# Patient Record
Sex: MALE | Race: WHITE | NOT HISPANIC OR LATINO | Employment: FULL TIME | URBAN - METROPOLITAN AREA
[De-identification: names, ages, dates, MRNs, and addresses within clinical notes are randomized per-mention and may not be internally consistent; named-entity substitution may affect disease eponyms.]

---

## 2019-01-21 ENCOUNTER — PATIENT OUTREACH (OUTPATIENT)
Dept: HEALTH INFORMATION MANAGEMENT | Facility: OTHER | Age: 69
End: 2019-01-21

## 2019-01-21 NOTE — PROGRESS NOTES
Outcome: Left Message    Please transfer to Patient Outreach Team at [phone] when patient returns call.    WebIZ Checked & Epic Updated:  yes    HealthConnect Verified: yes    Attempt # 1

## 2019-01-29 NOTE — PROGRESS NOTES
1. Attempt #:2    2. HealthConnect Verified: yes    3. Verify PCP: yes    4. Review Care Team: yes    6. Reviewed/Updated the following with patient:       •   Communication Preference Obtained? YES       •   Preferred Pharmacy? YES       •   Preferred Lab? YES       •   Family History (document living status of immediate family members and if + hx of cancer, diabetes, hypertension, hyperlipidemia, heart attack, stroke) YES    7. Annual Wellness Visit Scheduling  · Scheduling Status:Scheduled     8. Care Gap Scheduling (Attempt to Schedule EACH Overdue Care Gap!)     Health Maintenance Due   Topic Date Due   • DIABETES MONOFILAMENT / LE EXAM  1950   • RETINAL SCREENING  03/28/1970   • URINE ACR / MICROALBUMIN  03/28/1970   • IMM HEP B VACCINE (1 of 3 - Risk 3-dose series) 03/28/1971   • IMM DTaP/Tdap/Td Vaccine (1 - Tdap) 03/28/1971   • IMM ZOSTER VACCINES (1 of 2) 03/28/2002   • A1C SCREENING  10/24/2015   • FASTING LIPID PROFILE  04/24/2016   • SERUM CREATININE  04/24/2016   • Annual Wellness Visit  02/26/2017   • IMM PNEUMOCOCCAL 65+ (ADULT) LOW/MEDIUM RISK SERIES (1 of 2 - PCV13) 03/28/2017   • IMM INFLUENZA (1) 09/01/2018        Scheduled patient for Annual Wellness Visit and New Patient Appt to establish care with Faby     9. [employer] IT Activation: declined    10. Digital [employer] Real: no    11. Virtual Visits: no    12. Opt In to Text Messages: yes    13. Patient was advised: “This is a free wellness visit. The provider will screen for medical conditions to help you stay healthy. If you have other concerns to address you may be asked to discuss these at a separate visit or there may be an additional fee.”     14. Patient was informed to arrive 15 min prior to their scheduled appointment and bring in their medication bottles.

## 2019-02-12 ENCOUNTER — OFFICE VISIT (OUTPATIENT)
Dept: MEDICAL GROUP | Facility: PHYSICIAN GROUP | Age: 69
End: 2019-02-12
Payer: MEDICARE

## 2019-02-12 VITALS
SYSTOLIC BLOOD PRESSURE: 124 MMHG | TEMPERATURE: 98.2 F | BODY MASS INDEX: 24.48 KG/M2 | RESPIRATION RATE: 14 BRPM | HEART RATE: 87 BPM | HEIGHT: 70 IN | OXYGEN SATURATION: 99 % | DIASTOLIC BLOOD PRESSURE: 78 MMHG | WEIGHT: 171 LBS

## 2019-02-12 DIAGNOSIS — G62.9 NEUROPATHY: ICD-10-CM

## 2019-02-12 DIAGNOSIS — Z23 NEED FOR VACCINATION: ICD-10-CM

## 2019-02-12 DIAGNOSIS — E78.2 MIXED HYPERLIPIDEMIA: ICD-10-CM

## 2019-02-12 DIAGNOSIS — K21.00 GASTROESOPHAGEAL REFLUX DISEASE WITH ESOPHAGITIS: ICD-10-CM

## 2019-02-12 DIAGNOSIS — I25.10 CORONARY ARTERY DISEASE INVOLVING NATIVE CORONARY ARTERY OF NATIVE HEART WITHOUT ANGINA PECTORIS: ICD-10-CM

## 2019-02-12 DIAGNOSIS — R73.03 PREDIABETES: ICD-10-CM

## 2019-02-12 DIAGNOSIS — D64.9 ANEMIA, UNSPECIFIED TYPE: ICD-10-CM

## 2019-02-12 PROCEDURE — 90714 TD VACC NO PRESV 7 YRS+ IM: CPT | Performed by: FAMILY MEDICINE

## 2019-02-12 PROCEDURE — G0009 ADMIN PNEUMOCOCCAL VACCINE: HCPCS | Performed by: FAMILY MEDICINE

## 2019-02-12 PROCEDURE — 90670 PCV13 VACCINE IM: CPT | Performed by: FAMILY MEDICINE

## 2019-02-12 PROCEDURE — 99214 OFFICE O/P EST MOD 30 MIN: CPT | Mod: 25 | Performed by: FAMILY MEDICINE

## 2019-02-12 PROCEDURE — 90472 IMMUNIZATION ADMIN EACH ADD: CPT | Performed by: FAMILY MEDICINE

## 2019-02-12 RX ORDER — MORPHINE SULFATE 30 MG/1
TABLET, FILM COATED, EXTENDED RELEASE ORAL
Refills: 0 | COMMUNITY
Start: 2019-02-05

## 2019-02-12 ASSESSMENT — PATIENT HEALTH QUESTIONNAIRE - PHQ9: CLINICAL INTERPRETATION OF PHQ2 SCORE: 0

## 2019-02-12 NOTE — NON-PROVIDER

## 2019-02-12 NOTE — PROGRESS NOTES
CC: I need a primary    HISTORY OF PRESENT ILLNESS: Patient is a 68 y.o. male established patient who presents today to establish with a new provider after not having been seen in some time    Health Maintenance: Completed    Prediabetes  Lab Results   Component Value Date/Time    HBA1C 6.2 (H) 04/24/2015 01:50 PM    HBA1C 6.9 04/06/2011 10:19 AM     He was previously diagnosed with diabetes.  He has lost weight and changed his lifestyle since.  He has not been following this in some time.  He has cut way back on alcohol which he believes was a contributor.    Gastroesophageal reflux disease with esophagitis  He has GERD controlled with omeprazole.  No blood in the stool, dysphagia.    Coronary artery disease involving native coronary artery of native heart without angina pectoris  He had a stent placed in 1995 with Dr. Liao.  He has not been on aspirin in some time.    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  He is managing this with gabapentin.    Anemia  His Hg was 13.6 at his last draw in 2015.  He denies known bleeding and has had a recent colonoscopy      Patient Active Problem List    Diagnosis Date Noted   • Coronary artery disease involving native coronary artery of native heart without angina pectoris 02/12/2019   • Gastroesophageal reflux disease with esophagitis 02/12/2019   • Mixed hyperlipidemia 02/12/2019   • Anemia 02/12/2019   • Snoring 01/26/2012   • Osteoarthritis of CMC joint of thumb 10/27/2011   • Neuropathy (HCC) 04/06/2011   • History of gout 04/06/2011   • Prediabetes 04/06/2011      Allergies:Pcn [penicillins]    Current Outpatient Prescriptions   Medication Sig Dispense Refill   • morphine ER (MS CONTIN) 30 MG Tab CR tablet TK 1 T PO TID  0   • aspirin EC (ECOTRIN) 81 MG Tablet Delayed Response Take 1 Tab by mouth every day. 90 Tab    • gabapentin (NEURONTIN) 300 MG CAPS Take 300 mg by mouth 3 times a day.     • oxycodone IR (ROXICODONE) 10 MG immediate release tablet  Take 1-2 Tabs by mouth every four hours as needed. 150 Tab 0   • omeprazole (PRILOSEC) 20 MG CPDR Take 1 Cap by mouth every day.       No current facility-administered medications for this visit.        Social History   Substance Use Topics   • Smoking status: Former Smoker     Packs/day: 2.00     Years: 10.00     Types: Cigarettes     Quit date: 1/1/1984   • Smokeless tobacco: Never Used   • Alcohol use No      Comment: quit 2008     Social History     Social History Narrative    Retired        Family History   Problem Relation Age of Onset   • Cancer Mother         Colon   • Stroke Father    • Other Father         Coronary Issue   • Heart Disease Father    • Cancer Maternal Aunt         Colon   • Cancer Paternal Aunt         Colon   • Cancer Sister         bladder       Review of Systems:      - Constitutional: Negative for fever, chills, unexpected weight change, and fatigue/generalized weakness.     - HEENT: Negative for headaches, vision changes, hearing changes, ear pain, ear discharge, rhinorrhea, sinus congestion, sore throat, and neck pain.      - Respiratory: Negative for cough, sputum production, chest congestion, dyspnea, wheezing, and crackles.      - Cardiovascular: Negative for chest pain, palpitations, orthopnea, and bilateral lower extremity edema.     - Gastrointestinal: Negative for heartburn, nausea, vomiting, abdominal pain, hematochezia, melena, diarrhea, constipation, and greasy/foul-smelling stools.     - Genitourinary: Negative for dysuria, polyuria, hematuria, pyuria, urinary urgency, and urinary incontinence.    - Musculoskeletal: Negative for myalgias, back pain, and joint pain.     - Skin: Negative for rash, itching, cyanotic skin color change.     - Neurological: Negative for dizziness, tingling, tremors, focal sensory deficit, focal weakness and headaches.     - Endo/Heme/Allergies: Does not bruise/bleed easily.     - Psychiatric/Behavioral: Negative for  "depression, suicidal/homicidal ideation and memory loss.        Exam:    Blood pressure 124/78, pulse 87, temperature 36.8 °C (98.2 °F), resp. rate 14, height 1.778 m (5' 10\"), weight 77.6 kg (171 lb), SpO2 99 %. Body mass index is 24.54 kg/m².    General:  Well nourished, well developed male in NAD  Head is grossly normal.  Neck: Supple without JVD or bruit. Thyroid is not enlarged.  Pulmonary: Clear to ausculation and percussion.  Normal effort. No rales, ronchi, or wheezing.  Cardiovascular: Regular rate and rhythm without murmur. Carotid and radial pulses are intact and equal bilaterally.  Extremities: no clubbing, cyanosis, or edema.        Assessment/Plan:  1. Prediabetes  Discussed lifestyle management and reinforced heathy choices.  Will follow up labs to confirm that this is not worsening.  - Lipid Profile; Future  - Comp Metabolic Panel; Future    2. Coronary artery disease involving native coronary artery of native heart without angina pectoris  Records from [facility] do not appear to be available.  I have asked him to follow up with Dr. Liao as he should be on an antiplatelet after a stent.  There are no contraindications to aspirin use (peptic ulcer, GI bleed or allergy) so I have asked him to resume use for now.    - aspirin EC (ECOTRIN) 81 MG Tablet Delayed Response; Take 1 Tab by mouth every day.  Dispense: 90 Tab  - Lipid Profile; Future    3. Gastroesophageal reflux disease with esophagitis  Symptoms are infrequent, likely improved after he decreased alcohol use.  Will monitor.    4. Mixed hyperlipidemia  With a history of coronary artery disease he should be on a statin.  We will assess levels for risk as he prefers to limit medications    5. Need for vaccination  - Prevnar 13 PCV-13  - TD =>6yo IM    6. Anemia, unspecified type  Will repeat CBC, if still low will need iron studies and Colonoscopy recors  - CBC WITH DIFFERENTIAL; Future    7. Neuropathy (HCC)  Continue gabapentin " for neuropathy.  Dr. Massey manages pain medications.

## 2019-02-12 NOTE — ASSESSMENT & PLAN NOTE
Lab Results   Component Value Date/Time    HBA1C 6.2 (H) 04/24/2015 01:50 PM    HBA1C 6.9 04/06/2011 10:19 AM     He was previously diagnosed with diabetes.  He has lost weight and changed his lifestyle since.  He has not been following this in some time.  He has cut way back on alcohol which he believes was a contributor.

## 2019-02-12 NOTE — PATIENT INSTRUCTIONS
Today, your Healthcare Provider may have discussed the following recommendations:    1. Exercise and Physical Activity  According to the American Heart Association, it is recommended to engage in physical activity regularly and to aim for 150 minutes of moderate-intensity aerobic activity per week.  Your Healthcare Provider may have recommended taking the stairs instead of the elevator, starting or maintaining a walking program or strength-training program.    2. Emotional Well-being  Mental and emotional well-being is essential to overall health.  Your Healthcare Provider may have encouraged you to build strong, positive relationships with family and friends, become more involved in your community (by volunteering or joining a spiritual community), or focus on self-care.    3. Fall and Injury Prevention  To prevent falls and injuries and also improve your balance, your Healthcare Provider may have suggested that you use a cane or walker, start an exercise of physical therapy program, or have your vision and/or hearing tested.    4. Urinary Leakage (Urinary Incontinence)  To control or manage the leakage of urine, your Healthcare Provider may have recommended you start bladder training exercises (such as Kegel exercises), a trial of a medication or a referral to see a specialist to discuss surgical options.

## 2019-02-14 ENCOUNTER — HOSPITAL ENCOUNTER (OUTPATIENT)
Dept: LAB | Facility: MEDICAL CENTER | Age: 69
End: 2019-02-14
Attending: FAMILY MEDICINE
Payer: MEDICARE

## 2019-02-14 DIAGNOSIS — R73.03 PREDIABETES: ICD-10-CM

## 2019-02-14 DIAGNOSIS — D64.9 ANEMIA, UNSPECIFIED TYPE: ICD-10-CM

## 2019-02-14 DIAGNOSIS — I25.10 CORONARY ARTERY DISEASE INVOLVING NATIVE CORONARY ARTERY OF NATIVE HEART WITHOUT ANGINA PECTORIS: ICD-10-CM

## 2019-02-14 LAB
ALBUMIN SERPL BCP-MCNC: 4.2 G/DL (ref 3.2–4.9)
ALBUMIN/GLOB SERPL: 1.4 G/DL
ALP SERPL-CCNC: 78 U/L (ref 30–99)
ALT SERPL-CCNC: 12 U/L (ref 2–50)
ANION GAP SERPL CALC-SCNC: 12 MMOL/L (ref 0–11.9)
AST SERPL-CCNC: 15 U/L (ref 12–45)
BASOPHILS # BLD AUTO: 1.1 % (ref 0–1.8)
BASOPHILS # BLD: 0.06 K/UL (ref 0–0.12)
BILIRUB SERPL-MCNC: 0.5 MG/DL (ref 0.1–1.5)
BUN SERPL-MCNC: 19 MG/DL (ref 8–22)
CALCIUM SERPL-MCNC: 9.5 MG/DL (ref 8.5–10.5)
CHLORIDE SERPL-SCNC: 109 MMOL/L (ref 96–112)
CHOLEST SERPL-MCNC: 229 MG/DL (ref 100–199)
CO2 SERPL-SCNC: 23 MMOL/L (ref 20–33)
CREAT SERPL-MCNC: 1 MG/DL (ref 0.5–1.4)
EOSINOPHIL # BLD AUTO: 0.23 K/UL (ref 0–0.51)
EOSINOPHIL NFR BLD: 4.3 % (ref 0–6.9)
ERYTHROCYTE [DISTWIDTH] IN BLOOD BY AUTOMATED COUNT: 43.4 FL (ref 35.9–50)
FASTING STATUS PATIENT QL REPORTED: NORMAL
GLOBULIN SER CALC-MCNC: 2.9 G/DL (ref 1.9–3.5)
GLUCOSE SERPL-MCNC: 80 MG/DL (ref 65–99)
HCT VFR BLD AUTO: 40.4 % (ref 42–52)
HDLC SERPL-MCNC: 47 MG/DL
HGB BLD-MCNC: 13.3 G/DL (ref 14–18)
IMM GRANULOCYTES # BLD AUTO: 0.02 K/UL (ref 0–0.11)
IMM GRANULOCYTES NFR BLD AUTO: 0.4 % (ref 0–0.9)
LDLC SERPL CALC-MCNC: 146 MG/DL
LYMPHOCYTES # BLD AUTO: 1.98 K/UL (ref 1–4.8)
LYMPHOCYTES NFR BLD: 37.3 % (ref 22–41)
MCH RBC QN AUTO: 28.8 PG (ref 27–33)
MCHC RBC AUTO-ENTMCNC: 32.9 G/DL (ref 33.7–35.3)
MCV RBC AUTO: 87.4 FL (ref 81.4–97.8)
MONOCYTES # BLD AUTO: 0.53 K/UL (ref 0–0.85)
MONOCYTES NFR BLD AUTO: 10 % (ref 0–13.4)
NEUTROPHILS # BLD AUTO: 2.49 K/UL (ref 1.82–7.42)
NEUTROPHILS NFR BLD: 46.9 % (ref 44–72)
NRBC # BLD AUTO: 0 K/UL
NRBC BLD-RTO: 0 /100 WBC
PLATELET # BLD AUTO: 239 K/UL (ref 164–446)
PMV BLD AUTO: 9.9 FL (ref 9–12.9)
POTASSIUM SERPL-SCNC: 4 MMOL/L (ref 3.6–5.5)
PROT SERPL-MCNC: 7.1 G/DL (ref 6–8.2)
RBC # BLD AUTO: 4.62 M/UL (ref 4.7–6.1)
SODIUM SERPL-SCNC: 144 MMOL/L (ref 135–145)
TRIGL SERPL-MCNC: 180 MG/DL (ref 0–149)
WBC # BLD AUTO: 5.3 K/UL (ref 4.8–10.8)

## 2019-02-14 PROCEDURE — 80053 COMPREHEN METABOLIC PANEL: CPT

## 2019-02-14 PROCEDURE — 80061 LIPID PANEL: CPT

## 2019-02-14 PROCEDURE — 36415 COLL VENOUS BLD VENIPUNCTURE: CPT

## 2019-02-14 PROCEDURE — 85025 COMPLETE CBC W/AUTO DIFF WBC: CPT

## 2019-02-19 NOTE — ASSESSMENT & PLAN NOTE
His Hg was 13.6 at his last draw in 2015.  He denies known bleeding and has had a recent colonoscopy

## 2019-03-26 ENCOUNTER — OFFICE VISIT (OUTPATIENT)
Dept: MEDICAL GROUP | Facility: PHYSICIAN GROUP | Age: 69
End: 2019-03-26
Payer: MEDICARE

## 2019-03-26 VITALS
BODY MASS INDEX: 24.91 KG/M2 | RESPIRATION RATE: 14 BRPM | WEIGHT: 174 LBS | OXYGEN SATURATION: 95 % | HEIGHT: 70 IN | SYSTOLIC BLOOD PRESSURE: 120 MMHG | DIASTOLIC BLOOD PRESSURE: 88 MMHG | TEMPERATURE: 97.8 F | HEART RATE: 93 BPM

## 2019-03-26 DIAGNOSIS — Z12.11 SCREENING FOR COLON CANCER: ICD-10-CM

## 2019-03-26 DIAGNOSIS — E78.1 HYPERTRIGLYCERIDEMIA: ICD-10-CM

## 2019-03-26 DIAGNOSIS — K21.00 GASTROESOPHAGEAL REFLUX DISEASE WITH ESOPHAGITIS: ICD-10-CM

## 2019-03-26 DIAGNOSIS — I25.10 CORONARY ARTERY DISEASE INVOLVING NATIVE CORONARY ARTERY OF NATIVE HEART WITHOUT ANGINA PECTORIS: ICD-10-CM

## 2019-03-26 DIAGNOSIS — E78.2 MIXED HYPERLIPIDEMIA: ICD-10-CM

## 2019-03-26 DIAGNOSIS — D64.9 ANEMIA, UNSPECIFIED TYPE: ICD-10-CM

## 2019-03-26 PROCEDURE — 99214 OFFICE O/P EST MOD 30 MIN: CPT | Mod: 25 | Performed by: FAMILY MEDICINE

## 2019-03-26 PROCEDURE — 8041 PR SCP AHA: Performed by: FAMILY MEDICINE

## 2019-03-26 RX ORDER — ATORVASTATIN CALCIUM 20 MG/1
20 TABLET, FILM COATED ORAL DAILY
Qty: 90 TAB | Refills: 0 | Status: SHIPPED | OUTPATIENT
Start: 2019-03-26 | End: 2019-05-07

## 2019-03-26 NOTE — PATIENT INSTRUCTIONS
Today, your Healthcare Provider may have discussed the following recommendations:    1. Exercise and Physical Activity  According to the American Heart Association, it is recommended to engage in physical activity regularly and to aim for 150 minutes of moderate-intensity aerobic activity per week.  Your Healthcare Provider may have recommended taking the stairs instead of the elevator, starting or maintaining a walking program or strength-training program.    2. Emotional Well-being  Mental and emotional well-being is essential to overall health.  Your Healthcare Provider may have encouraged you to build strong, positive relationships with family and friends, become more involved in your community (by volunteering or joining a spiritual community), or focus on self-care.    3. Fall and Injury Prevention  To prevent falls and injuries and also improve your balance, your Healthcare Provider may have suggested that you use a cane or walker, start an exercise of physical therapy program, or have your vision and/or hearing tested.    4. Urinary Leakage (Urinary Incontinence)  To control or manage the leakage of urine, your Healthcare Provider may have recommended you start bladder training exercises (such as Kegel exercises), a trial of a medication or a referral to see a specialist to discuss surgical options.      Increase intake of iron rich foods  Foods high in iron   • Liver and other meats   • Oysters, clams   • Dried fruits like apricots, prunes and raisins   • Nuts   • Beans, especially lima beans   • Green leafy vegetables, such as spinach and broccoli   • Blackstrap molasses   • Special K, Cream of Wheat, Wheaties, Total cereal

## 2019-03-26 NOTE — ASSESSMENT & PLAN NOTE
He was previously on simvastatin for high cholesterol.  He has been off this for about 6 years.  He tolerated it well at the time but stopped when he moved.

## 2019-03-26 NOTE — ASSESSMENT & PLAN NOTE
He had a stent placed in 1995.  He has not been on a statin or plavix for some time but resumed a baby aspirin.  He denies any current chest pain or dyspnea with exertion.

## 2019-03-26 NOTE — PROGRESS NOTES

## 2019-03-26 NOTE — ASSESSMENT & PLAN NOTE
Lab Results   Component Value Date/Time    CHOLSTRLTOT 229 (H) 02/14/2019 09:21 AM    CHOLSTRLTOT 242 (H) 04/24/2015 01:50 PM     (H) 02/14/2019 09:21 AM    LDL see below 04/24/2015 01:50 PM    HDL 47 02/14/2019 09:21 AM    HDL 45 04/24/2015 01:50 PM    TRIGLYCERIDE 180 (H) 02/14/2019 09:21 AM    TRIGLYCERIDE 458 (H) 04/24/2015 01:50 PM     He has been eating more refined and processed foods lately.

## 2019-03-27 NOTE — ASSESSMENT & PLAN NOTE
He reports his symptoms are stable on omeprazole.  Recent labs do show mild anemia.  He denies dysphagia or hematochezia.

## 2019-03-27 NOTE — ASSESSMENT & PLAN NOTE
Lab Results   Component Value Date/Time    WBC 5.3 02/14/2019 09:21 AM    RBC 4.62 (L) 02/14/2019 09:21 AM    HEMOGLOBIN 13.3 (L) 02/14/2019 09:21 AM    HEMATOCRIT 40.4 (L) 02/14/2019 09:21 AM    MCV 87.4 02/14/2019 09:21 AM    MCH 28.8 02/14/2019 09:21 AM    MCHC 32.9 (L) 02/14/2019 09:21 AM    MPV 9.9 02/14/2019 09:21 AM    NEUTSPOLYS 46.90 02/14/2019 09:21 AM    LYMPHOCYTES 37.30 02/14/2019 09:21 AM    MONOCYTES 10.00 02/14/2019 09:21 AM    EOSINOPHILS 4.30 02/14/2019 09:21 AM    BASOPHILS 1.10 02/14/2019 09:21 AM       A mild anemia is present on his last labs.  He believes his diet has been poor.  He denies any recent known blood loss.

## 2019-03-27 NOTE — PROGRESS NOTES
CC: abnormal labs    HISTORY OF PRESENT ILLNESS: Patient is a 68 y.o. male established patient who presents today to follow up recent labs    Health Maintenance: reviewed    Mixed hyperlipidemia    He was previously on simvastatin for high cholesterol.  He has been off this for about 6 years.  He tolerated it well at the time but stopped when he moved.    Coronary artery disease involving native coronary artery of native heart without angina pectoris  He had a stent placed in 1995.  He has not been on a statin or plavix for some time but resumed a baby aspirin.  He denies any current chest pain or dyspnea with exertion.    Hypertriglyceridemia  Lab Results   Component Value Date/Time    CHOLSTRLTOT 229 (H) 02/14/2019 09:21 AM    CHOLSTRLTOT 242 (H) 04/24/2015 01:50 PM     (H) 02/14/2019 09:21 AM    LDL see below 04/24/2015 01:50 PM    HDL 47 02/14/2019 09:21 AM    HDL 45 04/24/2015 01:50 PM    TRIGLYCERIDE 180 (H) 02/14/2019 09:21 AM    TRIGLYCERIDE 458 (H) 04/24/2015 01:50 PM     He has been eating more refined and processed foods lately.        Gastroesophageal reflux disease with esophagitis  He reports his symptoms are stable on omeprazole.  Recent labs do show mild anemia.  He denies dysphagia or hematochezia.    Anemia  Lab Results   Component Value Date/Time    WBC 5.3 02/14/2019 09:21 AM    RBC 4.62 (L) 02/14/2019 09:21 AM    HEMOGLOBIN 13.3 (L) 02/14/2019 09:21 AM    HEMATOCRIT 40.4 (L) 02/14/2019 09:21 AM    MCV 87.4 02/14/2019 09:21 AM    MCH 28.8 02/14/2019 09:21 AM    MCHC 32.9 (L) 02/14/2019 09:21 AM    MPV 9.9 02/14/2019 09:21 AM    NEUTSPOLYS 46.90 02/14/2019 09:21 AM    LYMPHOCYTES 37.30 02/14/2019 09:21 AM    MONOCYTES 10.00 02/14/2019 09:21 AM    EOSINOPHILS 4.30 02/14/2019 09:21 AM    BASOPHILS 1.10 02/14/2019 09:21 AM       A mild anemia is present on his last labs.  He believes his diet has been poor.  He denies any recent known blood loss.      Patient Active Problem List    Diagnosis  Date Noted   • Hypertriglyceridemia 03/26/2019   • Coronary artery disease involving native coronary artery of native heart without angina pectoris 02/12/2019   • Gastroesophageal reflux disease with esophagitis 02/12/2019   • Mixed hyperlipidemia 02/12/2019   • Anemia 02/12/2019   • Snoring 01/26/2012   • Osteoarthritis of CMC joint of thumb 10/27/2011   • Neuropathy (HCC) 04/06/2011   • History of gout 04/06/2011   • Prediabetes 04/06/2011      Allergies:Pcn [penicillins]    Current Outpatient Prescriptions   Medication Sig Dispense Refill   • atorvastatin (LIPITOR) 20 MG Tab Take 1 Tab by mouth every day. 90 Tab 0   • morphine ER (MS CONTIN) 30 MG Tab CR tablet TK 1 T PO TID  0   • aspirin EC (ECOTRIN) 81 MG Tablet Delayed Response Take 1 Tab by mouth every day. 90 Tab    • gabapentin (NEURONTIN) 300 MG CAPS Take 300 mg by mouth 3 times a day.     • oxycodone IR (ROXICODONE) 10 MG immediate release tablet Take 1-2 Tabs by mouth every four hours as needed. 150 Tab 0   • omeprazole (PRILOSEC) 20 MG CPDR Take 1 Cap by mouth every day.       No current facility-administered medications for this visit.        Social History   Substance Use Topics   • Smoking status: Former Smoker     Packs/day: 2.00     Years: 10.00     Types: Cigarettes     Quit date: 1/1/1984   • Smokeless tobacco: Never Used   • Alcohol use No      Comment: quit 2008     Social History     Social History Narrative    Retired        Family History   Problem Relation Age of Onset   • Cancer Mother         Colon   • Stroke Father    • Other Father         Coronary Issue   • Heart Disease Father    • Cancer Maternal Aunt         Colon   • Cancer Paternal Aunt         Colon   • Cancer Sister         bladder       Review of Systems:      - Constitutional: Negative for fever, chills, unexpected weight change, and fatigue/generalized weakness.       - Respiratory: Negative for cough, sputum production, chest congestion,  "dyspnea, wheezing, and crackles.      - Cardiovascular: Negative for chest pain, palpitations, orthopnea, and bilateral lower extremity edema.     - Gastrointestinal: Negative for nausea, vomiting, abdominal pain, hematochezia, melena, diarrhea, constipation, and greasy/foul-smelling stools.        Exam:    Blood pressure 120/88, pulse 93, temperature 36.6 °C (97.8 °F), resp. rate 14, height 1.778 m (5' 10\"), weight 78.9 kg (174 lb), SpO2 95 %. Body mass index is 24.97 kg/m².    General:  Well nourished, well developed male in NAD  Head is grossly normal.  Neck: Supple without JVD or bruit. Thyroid is not enlarged.  Pulmonary: Clear to ausculation and percussion.  Normal effort. No rales, ronchi, or wheezing.  Cardiovascular: Regular rate and rhythm without murmur. Carotid and radial pulses are intact and equal bilaterally.  Extremities: no clubbing, cyanosis, or edema.      Assessment/Plan:  1. Mixed hyperlipidemia  Ongoing use of a statin is indicated for secondary prevention.  His cholesterol levels remain high even though his lifestyle has changed since heart disease was diagnosed.  He tolerated statins in the past.  We will resume a moderate intensity statin and increase the dose as tolerated with a goal LDL of < 70.  - atorvastatin (LIPITOR) 20 MG Tab; Take 1 Tab by mouth every day.  Dispense: 90 Tab; Refill: 0  - Lipid Profile; Future    2. Anemia, unspecified type  Normocytic anemia noted, will assess for nutritional causes.  He has a family history of colon cancer and reports his last colonoscopy was more than 10 years ago.  I have asked him to increase iron intake in foods and have a routine screening colonoscopy done.  - FERRITIN; Future  - CBC WITH DIFFERENTIAL; Future  - TRANSFERRIN; Future  - IRON; Future  - VITAMIN B12; Future    3. Coronary artery disease involving native coronary artery of native heart without angina pectoris  This is stable.  Continue daily aspirin and statin.    4. " Hypertriglyceridemia  Will continue to monitor, no indication for separate treatment at this time.    5. Gastroesophageal reflux disease with esophagitis  Symptoms are stable but given the diagnosis of reflux and concern for anemia will refer to GI to consider upper and lower endoscopy.  - REFERRAL TO GI FOR COLONOSCOPY    6. Screening for colon cancer  The indications for colon cancer screening were discussed based on his age were discussed.  As he is at increased risk the best option for colon cancer screening is colonoscopy.  The risks and benefits of the different options were discussed.  he elects to proceed with colonoscopy which was ordered.      - REFERRAL TO GI FOR COLONOSCOPY

## 2019-04-08 ENCOUNTER — PATIENT OUTREACH (OUTPATIENT)
Dept: HEALTH INFORMATION MANAGEMENT | Facility: OTHER | Age: 69
End: 2019-04-08

## 2019-04-09 ENCOUNTER — APPOINTMENT (OUTPATIENT)
Dept: OTHER | Facility: IMAGING CENTER | Age: 69
End: 2019-04-09

## 2019-05-07 ENCOUNTER — OFFICE VISIT (OUTPATIENT)
Dept: MEDICAL GROUP | Facility: PHYSICIAN GROUP | Age: 69
End: 2019-05-07
Payer: MEDICARE

## 2019-05-07 VITALS
SYSTOLIC BLOOD PRESSURE: 116 MMHG | RESPIRATION RATE: 16 BRPM | HEART RATE: 122 BPM | TEMPERATURE: 98.8 F | DIASTOLIC BLOOD PRESSURE: 84 MMHG | OXYGEN SATURATION: 97 % | WEIGHT: 163 LBS | BODY MASS INDEX: 22.82 KG/M2 | HEIGHT: 71 IN

## 2019-05-07 DIAGNOSIS — I25.10 CORONARY ARTERY DISEASE INVOLVING NATIVE CORONARY ARTERY OF NATIVE HEART WITHOUT ANGINA PECTORIS: ICD-10-CM

## 2019-05-07 DIAGNOSIS — R00.2 PALPITATIONS: ICD-10-CM

## 2019-05-07 DIAGNOSIS — D64.9 ANEMIA, UNSPECIFIED TYPE: ICD-10-CM

## 2019-05-07 DIAGNOSIS — K21.00 GASTROESOPHAGEAL REFLUX DISEASE WITH ESOPHAGITIS: ICD-10-CM

## 2019-05-07 DIAGNOSIS — E78.2 MIXED HYPERLIPIDEMIA: ICD-10-CM

## 2019-05-07 PROCEDURE — 99214 OFFICE O/P EST MOD 30 MIN: CPT | Performed by: FAMILY MEDICINE

## 2019-05-07 PROCEDURE — 93000 ELECTROCARDIOGRAM COMPLETE: CPT | Performed by: FAMILY MEDICINE

## 2019-05-07 RX ORDER — SIMVASTATIN 20 MG
20 TABLET ORAL EVERY EVENING
Qty: 90 TAB | Refills: 0 | Status: SHIPPED | OUTPATIENT
Start: 2019-05-07 | End: 2019-06-18

## 2019-05-07 NOTE — ASSESSMENT & PLAN NOTE
He has seen GI to discuss colonoscopy.  He has a history of Barretts and reflux.    Lab Results   Component Value Date/Time    WBC 5.3 02/14/2019 09:21 AM    RBC 4.62 (L) 02/14/2019 09:21 AM    HEMOGLOBIN 13.3 (L) 02/14/2019 09:21 AM    HEMATOCRIT 40.4 (L) 02/14/2019 09:21 AM    MCV 87.4 02/14/2019 09:21 AM    MCH 28.8 02/14/2019 09:21 AM    MCHC 32.9 (L) 02/14/2019 09:21 AM    MPV 9.9 02/14/2019 09:21 AM    NEUTSPOLYS 46.90 02/14/2019 09:21 AM    LYMPHOCYTES 37.30 02/14/2019 09:21 AM    MONOCYTES 10.00 02/14/2019 09:21 AM    EOSINOPHILS 4.30 02/14/2019 09:21 AM    BASOPHILS 1.10 02/14/2019 09:21 AM

## 2019-05-20 PROBLEM — R00.2 PALPITATIONS: Status: ACTIVE | Noted: 2019-05-20

## 2019-05-21 NOTE — ASSESSMENT & PLAN NOTE
Lab Results   Component Value Date/Time    CHOLSTRLTOT 229 (H) 02/14/2019 09:21 AM    CHOLSTRLTOT 242 (H) 04/24/2015 01:50 PM     (H) 02/14/2019 09:21 AM    LDL see below 04/24/2015 01:50 PM    HDL 47 02/14/2019 09:21 AM    HDL 45 04/24/2015 01:50 PM    TRIGLYCERIDE 180 (H) 02/14/2019 09:21 AM    TRIGLYCERIDE 458 (H) 04/24/2015 01:50 PM       The 10-year ASCVD risk score (Arnie CUEVAS Jr., et al., 2013) is: 14%

## 2019-05-21 NOTE — ASSESSMENT & PLAN NOTE
His HR is fast today.  He does note feeling extra heart beats for the last few days.  He denies any dyspnea, chest pain or dizziness.

## 2019-05-21 NOTE — PROGRESS NOTES
CC: abnormal labs    HISTORY OF PRESENT ILLNESS: Patient is a 69 y.o. male established patient who presents today to discuss recent labs    Health Maintenance: Completed    Anemia  He has seen GI to discuss colonoscopy.  He has a history of Barretts and reflux.    Lab Results   Component Value Date/Time    WBC 5.3 02/14/2019 09:21 AM    RBC 4.62 (L) 02/14/2019 09:21 AM    HEMOGLOBIN 13.3 (L) 02/14/2019 09:21 AM    HEMATOCRIT 40.4 (L) 02/14/2019 09:21 AM    MCV 87.4 02/14/2019 09:21 AM    MCH 28.8 02/14/2019 09:21 AM    MCHC 32.9 (L) 02/14/2019 09:21 AM    MPV 9.9 02/14/2019 09:21 AM    NEUTSPOLYS 46.90 02/14/2019 09:21 AM    LYMPHOCYTES 37.30 02/14/2019 09:21 AM    MONOCYTES 10.00 02/14/2019 09:21 AM    EOSINOPHILS 4.30 02/14/2019 09:21 AM    BASOPHILS 1.10 02/14/2019 09:21 AM          Gastroesophageal reflux disease with esophagitis  He is taking omeprazole for this, symptoms are controlled on omeprazole but occur if he stops it.    Mixed hyperlipidemia  Lab Results   Component Value Date/Time    CHOLSTRLTOT 229 (H) 02/14/2019 09:21 AM    CHOLSTRLTOT 242 (H) 04/24/2015 01:50 PM     (H) 02/14/2019 09:21 AM    LDL see below 04/24/2015 01:50 PM    HDL 47 02/14/2019 09:21 AM    HDL 45 04/24/2015 01:50 PM    TRIGLYCERIDE 180 (H) 02/14/2019 09:21 AM    TRIGLYCERIDE 458 (H) 04/24/2015 01:50 PM       The 10-year ASCVD risk score (Arnie DC Jr., et al., 2013) is: 14%      Coronary artery disease involving native coronary artery of native heart without angina pectoris  He remains on aspirin for this.  He lost a lot of weight after his heart attack and stent.  He has not been on a statin.  He denies symptoms.    Palpitations  His HR is fast today.  He does note feeling extra heart beats for the last few days.  He denies any dyspnea, chest pain or dizziness.        Patient Active Problem List    Diagnosis Date Noted   • Palpitations 05/20/2019   • Hypertriglyceridemia 03/26/2019   • Coronary artery disease involving  native coronary artery of native heart without angina pectoris 02/12/2019   • Gastroesophageal reflux disease with esophagitis 02/12/2019   • Mixed hyperlipidemia 02/12/2019   • Anemia 02/12/2019   • Snoring 01/26/2012   • Osteoarthritis of CMC joint of thumb 10/27/2011   • Neuropathy (HCC) 04/06/2011   • History of gout 04/06/2011   • Prediabetes 04/06/2011      Allergies:Pcn [penicillins]    Current Outpatient Prescriptions   Medication Sig Dispense Refill   • simvastatin (ZOCOR) 20 MG Tab Take 1 Tab by mouth every evening. 90 Tab 0   • morphine ER (MS CONTIN) 30 MG Tab CR tablet TK 1 T PO TID  0   • aspirin EC (ECOTRIN) 81 MG Tablet Delayed Response Take 1 Tab by mouth every day. 90 Tab    • gabapentin (NEURONTIN) 300 MG CAPS Take 300 mg by mouth 3 times a day.     • oxycodone IR (ROXICODONE) 10 MG immediate release tablet Take 1-2 Tabs by mouth every four hours as needed. 150 Tab 0   • omeprazole (PRILOSEC) 20 MG CPDR Take 1 Cap by mouth every day.       No current facility-administered medications for this visit.        Social History   Substance Use Topics   • Smoking status: Former Smoker     Packs/day: 2.00     Years: 10.00     Types: Cigarettes     Quit date: 1/1/1984   • Smokeless tobacco: Never Used   • Alcohol use No      Comment: quit 2008     Social History     Social History Narrative    Retired        Family History   Problem Relation Age of Onset   • Cancer Mother         Colon   • Stroke Father    • Other Father         Coronary Issue   • Heart Disease Father    • Cancer Maternal Aunt         Colon   • Cancer Paternal Aunt         Colon   • Cancer Sister         bladder       Review of Systems:      - Constitutional: Negative for fever, chills, unexpected weight change, and fatigue/generalized weakness.     - HEENT: Negative for headaches, vision changes, hearing changes, ear pain, ear discharge, rhinorrhea, sinus congestion, sore throat, and neck pain.      -  "Respiratory: Negative for cough, sputum production, chest congestion, dyspnea, wheezing, and crackles.      - Cardiovascular: Negative for chest pain, palpitations, orthopnea, and bilateral lower extremity edema.     - Gastrointestinal: Negative for nausea, vomiting, abdominal pain, hematochezia, melena, diarrhea, constipation, and greasy/foul-smelling stools.     Exam:    /84 (BP Location: Right arm, Patient Position: Sitting, BP Cuff Size: Adult)   Pulse (!) 122   Temp 37.1 °C (98.8 °F) (Temporal)   Resp 16   Ht 1.803 m (5' 11\")   Wt 73.9 kg (163 lb)   SpO2 97%  Body mass index is 22.73 kg/m².    General:  Well nourished, well developed male in NAD  Head is grossly normal.  Neck: Supple without JVD or bruit. Thyroid is not enlarged.  Pulmonary: Clear to ausculation and percussion.  Normal effort. No rales, ronchi, or wheezing.  Cardiovascular: Regular rate and rhythm with extra beats  Carotid and radial pulses are intact and equal bilaterally.  Extremities: no clubbing, cyanosis, or edema.    EKG Interpretation   Interpreted by me   Rhythm: sinus   Rate: normal   Axis: normal   Ectopy: occasional bigeminy  Conduction: normal   ST Segments: no acute change   T Waves: no acute change   Q Waves: in inferior leads  Clinical Impression: no acute changes and normal EKG      Assessment/Plan:  1. Coronary artery disease involving native coronary artery of native heart without angina pectoris    This is asymptomatic, will manage medically.  Continue aspirin, start lipid therapy.    2. Mixed hyperlipidemia  Lipids suggest high risk though he should be on a statin for secondary prevention as well.    - simvastatin (ZOCOR) 20 MG Tab; Take 1 Tab by mouth every evening.  Dispense: 90 Tab; Refill: 0    3. Anemia, unspecified type  Likely due to iron loss, start iron and will look for GI records.    4. Gastroesophageal reflux disease with esophagitis  Continue PPI.    5. Palpitations  EKG is reassuring, symptom are " explained by vik which is benign.  He was reassured.  We will monitor symptoms.  - EKG - Clinic Performed

## 2019-05-21 NOTE — ASSESSMENT & PLAN NOTE
He remains on aspirin for this.  He lost a lot of weight after his heart attack and stent.  He has not been on a statin.  He denies symptoms.

## 2019-05-28 ENCOUNTER — HOSPITAL ENCOUNTER (OUTPATIENT)
Dept: LAB | Facility: MEDICAL CENTER | Age: 69
End: 2019-05-28
Attending: FAMILY MEDICINE
Payer: MEDICARE

## 2019-05-28 ENCOUNTER — HOSPITAL ENCOUNTER (OUTPATIENT)
Dept: LAB | Facility: MEDICAL CENTER | Age: 69
End: 2019-05-28
Attending: PHYSICIAN ASSISTANT
Payer: MEDICARE

## 2019-05-28 DIAGNOSIS — D64.9 ANEMIA, UNSPECIFIED TYPE: ICD-10-CM

## 2019-05-28 LAB
BASOPHILS # BLD AUTO: 0.5 % (ref 0–1.8)
BASOPHILS # BLD: 0.04 K/UL (ref 0–0.12)
EOSINOPHIL # BLD AUTO: 0.02 K/UL (ref 0–0.51)
EOSINOPHIL NFR BLD: 0.2 % (ref 0–6.9)
ERYTHROCYTE [DISTWIDTH] IN BLOOD BY AUTOMATED COUNT: 42.5 FL (ref 35.9–50)
HCT VFR BLD AUTO: 39.1 % (ref 42–52)
HGB BLD-MCNC: 13 G/DL (ref 14–18)
IMM GRANULOCYTES # BLD AUTO: 0.06 K/UL (ref 0–0.11)
IMM GRANULOCYTES NFR BLD AUTO: 0.7 % (ref 0–0.9)
LYMPHOCYTES # BLD AUTO: 1.54 K/UL (ref 1–4.8)
LYMPHOCYTES NFR BLD: 19.1 % (ref 22–41)
MCH RBC QN AUTO: 28.8 PG (ref 27–33)
MCHC RBC AUTO-ENTMCNC: 33.2 G/DL (ref 33.7–35.3)
MCV RBC AUTO: 86.7 FL (ref 81.4–97.8)
MONOCYTES # BLD AUTO: 0.55 K/UL (ref 0–0.85)
MONOCYTES NFR BLD AUTO: 6.8 % (ref 0–13.4)
NEUTROPHILS # BLD AUTO: 5.87 K/UL (ref 1.82–7.42)
NEUTROPHILS NFR BLD: 72.7 % (ref 44–72)
NRBC # BLD AUTO: 0 K/UL
NRBC BLD-RTO: 0 /100 WBC
PLATELET # BLD AUTO: 299 K/UL (ref 164–446)
PMV BLD AUTO: 9.7 FL (ref 9–12.9)
RBC # BLD AUTO: 4.51 M/UL (ref 4.7–6.1)
WBC # BLD AUTO: 8.1 K/UL (ref 4.8–10.8)

## 2019-05-28 PROCEDURE — 82607 VITAMIN B-12: CPT

## 2019-05-28 PROCEDURE — 83540 ASSAY OF IRON: CPT | Mod: 91

## 2019-05-28 PROCEDURE — 82728 ASSAY OF FERRITIN: CPT

## 2019-05-28 PROCEDURE — 85025 COMPLETE CBC W/AUTO DIFF WBC: CPT

## 2019-05-28 PROCEDURE — 83550 IRON BINDING TEST: CPT

## 2019-05-28 PROCEDURE — 84466 ASSAY OF TRANSFERRIN: CPT

## 2019-05-28 PROCEDURE — 83540 ASSAY OF IRON: CPT

## 2019-05-28 PROCEDURE — 36415 COLL VENOUS BLD VENIPUNCTURE: CPT

## 2019-05-29 LAB
FERRITIN SERPL-MCNC: 43.3 NG/ML (ref 22–322)
IRON SATN MFR SERPL: 25 % (ref 15–55)
IRON SERPL-MCNC: 109 UG/DL (ref 50–180)
IRON SERPL-MCNC: 110 UG/DL (ref 50–180)
TIBC SERPL-MCNC: 433 UG/DL (ref 250–450)
TRANSFERRIN SERPL-MCNC: 307 MG/DL (ref 200–370)
VIT B12 SERPL-MCNC: 225 PG/ML (ref 211–911)

## 2019-05-29 PROCEDURE — 36415 COLL VENOUS BLD VENIPUNCTURE: CPT

## 2019-05-29 PROCEDURE — 83540 ASSAY OF IRON: CPT

## 2019-05-29 PROCEDURE — 83550 IRON BINDING TEST: CPT

## 2019-08-08 ENCOUNTER — HOSPITAL ENCOUNTER (OUTPATIENT)
Dept: LAB | Facility: MEDICAL CENTER | Age: 69
End: 2019-08-08
Attending: FAMILY MEDICINE
Payer: MEDICARE

## 2019-08-08 ENCOUNTER — OFFICE VISIT (OUTPATIENT)
Dept: MEDICAL GROUP | Facility: PHYSICIAN GROUP | Age: 69
End: 2019-08-08
Payer: MEDICARE

## 2019-08-08 VITALS
OXYGEN SATURATION: 98 % | DIASTOLIC BLOOD PRESSURE: 82 MMHG | BODY MASS INDEX: 23.66 KG/M2 | HEART RATE: 90 BPM | WEIGHT: 169 LBS | TEMPERATURE: 98.3 F | RESPIRATION RATE: 14 BRPM | SYSTOLIC BLOOD PRESSURE: 118 MMHG | HEIGHT: 71 IN

## 2019-08-08 DIAGNOSIS — D64.9 ANEMIA, UNSPECIFIED TYPE: ICD-10-CM

## 2019-08-08 DIAGNOSIS — E53.8 VITAMIN B12 DEFICIENCY: ICD-10-CM

## 2019-08-08 DIAGNOSIS — K21.00 GASTROESOPHAGEAL REFLUX DISEASE WITH ESOPHAGITIS: ICD-10-CM

## 2019-08-08 DIAGNOSIS — Z91.89 ENCOUNTER FOR HEPATITIS C VIRUS SCREENING TEST FOR HIGH RISK PATIENT: ICD-10-CM

## 2019-08-08 DIAGNOSIS — R23.8 LONGITUDINAL RIDGING OF NAIL: ICD-10-CM

## 2019-08-08 DIAGNOSIS — I25.10 CORONARY ARTERY DISEASE INVOLVING NATIVE CORONARY ARTERY OF NATIVE HEART WITHOUT ANGINA PECTORIS: ICD-10-CM

## 2019-08-08 DIAGNOSIS — E78.2 MIXED HYPERLIPIDEMIA: ICD-10-CM

## 2019-08-08 DIAGNOSIS — Z11.59 ENCOUNTER FOR HEPATITIS C VIRUS SCREENING TEST FOR HIGH RISK PATIENT: ICD-10-CM

## 2019-08-08 LAB
ALBUMIN SERPL BCP-MCNC: 4.8 G/DL (ref 3.2–4.9)
ALBUMIN/GLOB SERPL: 2 G/DL
ALP SERPL-CCNC: 78 U/L (ref 30–99)
ALT SERPL-CCNC: 10 U/L (ref 2–50)
ANION GAP SERPL CALC-SCNC: 12 MMOL/L (ref 0–11.9)
AST SERPL-CCNC: 15 U/L (ref 12–45)
BILIRUB SERPL-MCNC: 1.4 MG/DL (ref 0.1–1.5)
BUN SERPL-MCNC: 38 MG/DL (ref 8–22)
CALCIUM SERPL-MCNC: 8.8 MG/DL (ref 8.5–10.5)
CHLORIDE SERPL-SCNC: 96 MMOL/L (ref 96–112)
CHOLEST SERPL-MCNC: 151 MG/DL (ref 100–199)
CO2 SERPL-SCNC: 30 MMOL/L (ref 20–33)
CREAT SERPL-MCNC: 1.57 MG/DL (ref 0.5–1.4)
FASTING STATUS PATIENT QL REPORTED: NORMAL
GLOBULIN SER CALC-MCNC: 2.4 G/DL (ref 1.9–3.5)
GLUCOSE SERPL-MCNC: 95 MG/DL (ref 65–99)
HDLC SERPL-MCNC: 46 MG/DL
LDLC SERPL CALC-MCNC: 55 MG/DL
POTASSIUM SERPL-SCNC: 3.2 MMOL/L (ref 3.6–5.5)
PROT SERPL-MCNC: 7.2 G/DL (ref 6–8.2)
SODIUM SERPL-SCNC: 138 MMOL/L (ref 135–145)
TRIGL SERPL-MCNC: 249 MG/DL (ref 0–149)

## 2019-08-08 PROCEDURE — 99214 OFFICE O/P EST MOD 30 MIN: CPT | Performed by: FAMILY MEDICINE

## 2019-08-08 PROCEDURE — 80061 LIPID PANEL: CPT

## 2019-08-08 PROCEDURE — 36415 COLL VENOUS BLD VENIPUNCTURE: CPT

## 2019-08-08 PROCEDURE — 80053 COMPREHEN METABOLIC PANEL: CPT

## 2019-08-08 PROCEDURE — 86803 HEPATITIS C AB TEST: CPT

## 2019-08-08 NOTE — PROGRESS NOTES
CC: cholesterol, I need you to see my nails    HISTORY OF PRESENT ILLNESS: Patient is a 69 y.o. male established patient who presents today to follow up the following chronic concerns    Health Maintenance: Completed    Anemia  Recent labs show normal iron studies but he does have an iron deficiency but he has an anemia.  Lab Results   Component Value Date/Time    WBC 8.1 05/28/2019 03:42 PM    RBC 4.51 (L) 05/28/2019 03:42 PM    HEMOGLOBIN 13.0 (L) 05/28/2019 03:42 PM    HEMATOCRIT 39.1 (L) 05/28/2019 03:42 PM    MCV 86.7 05/28/2019 03:42 PM    MCH 28.8 05/28/2019 03:42 PM    MCHC 33.2 (L) 05/28/2019 03:42 PM    MPV 9.7 05/28/2019 03:42 PM    NEUTSPOLYS 72.70 (H) 05/28/2019 03:42 PM    LYMPHOCYTES 19.10 (L) 05/28/2019 03:42 PM    MONOCYTES 6.80 05/28/2019 03:42 PM    EOSINOPHILS 0.20 05/28/2019 03:42 PM    BASOPHILS 0.50 05/28/2019 03:42 PM      He has not had a colonoscopy in 20 years but does not want to have it now.  He has colon cancer in his family on both sides.  He has established with GI but has not scheduled a follow up colonoscopy yet.    Mixed hyperlipidemia  Lab Results   Component Value Date/Time    CHOLSTRLTOT 229 (H) 02/14/2019 09:21 AM    CHOLSTRLTOT 242 (H) 04/24/2015 01:50 PM     (H) 02/14/2019 09:21 AM    LDL see below 04/24/2015 01:50 PM    HDL 47 02/14/2019 09:21 AM    HDL 45 04/24/2015 01:50 PM    TRIGLYCERIDE 180 (H) 02/14/2019 09:21 AM    TRIGLYCERIDE 458 (H) 04/24/2015 01:50 PM       He is on lipitor 20 mg daily and tolerating it well.  He is due for repeat labs.    Vitamin B12 deficiency  He has started taking vitamin B12 1000 mcg daily sublingual for about a month.  Last B12 level was low normal at 225.    Gastroesophageal reflux disease with esophagitis  He is taking omeprazole daily for years.  He has a history of esophageal erosions and hiatal hernia.  He tried weaning it to every other day but did not do well.      Longitudinal ridging of nail  He has noticed longitudinal  ridges in his nail for about a year.  He denies other skin changes.    Coronary artery disease involving native coronary artery of native heart without angina pectoris  He is doing well and is on aspirin and recently a statin.  He denies any chest pain or dyspnea.      Patient Active Problem List    Diagnosis Date Noted   • Vitamin B12 deficiency 2019   • Longitudinal ridging of nail 2019   • Palpitations 2019   • Hypertriglyceridemia 2019   • Coronary artery disease involving native coronary artery of native heart without angina pectoris 2019   • Gastroesophageal reflux disease with esophagitis 2019   • Mixed hyperlipidemia 2019   • Anemia 2019   • Snoring 2012   • Osteoarthritis of CMC joint of thumb 10/27/2011   • Neuropathy (HCC) 2011   • Prediabetes 2011      Allergies:Pcn [penicillins]    Current Outpatient Medications   Medication Sig Dispense Refill   • LYRICA 75 MG Cap TK ONE C PO BID  5   • atorvastatin (LIPITOR) 20 MG Tab TAKE 1 TABLET BY MOUTH EVERY  Tab 0   • morphine ER (MS CONTIN) 30 MG Tab CR tablet TK 1 T PO TID  0   • aspirin EC (ECOTRIN) 81 MG Tablet Delayed Response Take 1 Tab by mouth every day. 90 Tab    • gabapentin (NEURONTIN) 300 MG CAPS Take 300 mg by mouth 3 times a day.     • oxycodone IR (ROXICODONE) 10 MG immediate release tablet Take 1-2 Tabs by mouth every four hours as needed. 150 Tab 0   • omeprazole (PRILOSEC) 20 MG CPDR Take 1 Cap by mouth every day.       No current facility-administered medications for this visit.        Social History     Tobacco Use   • Smoking status: Former Smoker     Packs/day: 2.00     Years: 10.00     Pack years: 20.00     Types: Cigarettes     Last attempt to quit: 1984     Years since quittin.6   • Smokeless tobacco: Never Used   Substance Use Topics   • Alcohol use: No     Comment: quit    • Drug use: No     Social History     Social History Narrative    Retired heavy  "equipment        Family History   Problem Relation Age of Onset   • Cancer Mother         Colon   • Stroke Father    • Other Father         Coronary Issue   • Heart Disease Father    • Cancer Maternal Aunt         Colon   • Cancer Paternal Aunt         Colon   • Cancer Sister         bladder       Review of Systems:      - Constitutional: Negative for fever, chills, unexpected weight change, and fatigue/generalized weakness.     - Respiratory: Negative for cough, sputum production, chest congestion, dyspnea, wheezing, and crackles.      - Cardiovascular: Negative for chest pain, palpitations, orthopnea, and bilateral lower extremity edema.     - Gastrointestinal: Negative for nausea, vomiting, abdominal pain, hematochezia, melena, diarrhea, constipation, and greasy/foul-smelling stools.       - Psychiatric/Behavioral: Negative for depression, suicidal/homicidal ideation and memory loss.        Exam:    /82 (BP Location: Left arm, Patient Position: Sitting, BP Cuff Size: Large adult)   Pulse 90   Temp 36.8 °C (98.3 °F) (Temporal)   Resp 14   Ht 1.803 m (5' 11\")   Wt 76.7 kg (169 lb)   SpO2 98%  Body mass index is 23.57 kg/m².    General:  Well nourished, well developed male in NAD  Head is grossly normal.  Neck: Supple without JVD or bruit. Thyroid is not enlarged.  Pulmonary: Clear to ausculation and percussion.  Normal effort. No rales, ronchi, or wheezing.  Cardiovascular: Regular rate and rhythm without murmur. Carotid and radial pulses are intact and equal bilaterally.  Extremities: no clubbing, cyanosis, or edema.  Skin: symmetric longitudinal ridging of all fingernails without nailbed changes or skin changes    Assessment/Plan:  1. Anemia, unspecified type  This does not appear to be a clear iron deficiency.  It is normocytic and ferritin is normal.  That being said with his family history of colon cancer I would like him to have another colonoscopy and have strongly encouraged him to " do so as soon as he can.    2. Mixed hyperlipidemia  He is appropriately on a statin, will check lipid levels, goal is < 100.  - Lipid Profile; Future  - Comp Metabolic Panel; Future    3. Encounter for hepatitis C virus screening test for high risk patient  One-time hepatitis C screening is indicated for those born between 1945 and 1965.  This indication as well as the the risks of toy hepatitis C were reviewed.  he would like to proceed with screening.    - HEP C VIRUS ANTIBODY; Future    4. Vitamin B12 deficiency  Continue B12, will need repeat labs with next lab draw    5. Gastroesophageal reflux disease with esophagitis  Take omeprazole daily for now.  I would consider EGD with colonoscopy if GI is willing.    6. Coronary artery disease involving native coronary artery of native heart without angina pectoris  Continue aspirin and statin.    - Comp Metabolic Panel; Future    7. Longitudinal ridging of nail  This is likely age related.  There do not seem to be signs of systemic disease and changes are uniform.  Goal would be to improve anemia if possible and treat vitamin deficiencies.  Continue B12 and take vitamin D.

## 2019-08-08 NOTE — ASSESSMENT & PLAN NOTE
Lab Results   Component Value Date/Time    CHOLSTRLTOT 229 (H) 02/14/2019 09:21 AM    CHOLSTRLTOT 242 (H) 04/24/2015 01:50 PM     (H) 02/14/2019 09:21 AM    LDL see below 04/24/2015 01:50 PM    HDL 47 02/14/2019 09:21 AM    HDL 45 04/24/2015 01:50 PM    TRIGLYCERIDE 180 (H) 02/14/2019 09:21 AM    TRIGLYCERIDE 458 (H) 04/24/2015 01:50 PM       He is on lipitor 20 mg daily and tolerating it well.  He is due for repeat labs.

## 2019-08-08 NOTE — ASSESSMENT & PLAN NOTE
He is taking omeprazole daily for years.  He has a history of esophageal erosions and hiatal hernia.  He tried weaning it to every other day but did not do well.

## 2019-08-08 NOTE — ASSESSMENT & PLAN NOTE
He has started taking vitamin B12 1000 mcg daily sublingual for about a month.  Last B12 level was low normal at 225.

## 2019-08-08 NOTE — ASSESSMENT & PLAN NOTE
Recent labs show normal iron studies but he does have an iron deficiency but he has an anemia.  Lab Results   Component Value Date/Time    WBC 8.1 05/28/2019 03:42 PM    RBC 4.51 (L) 05/28/2019 03:42 PM    HEMOGLOBIN 13.0 (L) 05/28/2019 03:42 PM    HEMATOCRIT 39.1 (L) 05/28/2019 03:42 PM    MCV 86.7 05/28/2019 03:42 PM    MCH 28.8 05/28/2019 03:42 PM    MCHC 33.2 (L) 05/28/2019 03:42 PM    MPV 9.7 05/28/2019 03:42 PM    NEUTSPOLYS 72.70 (H) 05/28/2019 03:42 PM    LYMPHOCYTES 19.10 (L) 05/28/2019 03:42 PM    MONOCYTES 6.80 05/28/2019 03:42 PM    EOSINOPHILS 0.20 05/28/2019 03:42 PM    BASOPHILS 0.50 05/28/2019 03:42 PM      He has not had a colonoscopy in 20 years but does not want to have it now.  He has colon cancer in his family on both sides.  He has established with GI but has not scheduled a follow up colonoscopy yet.

## 2019-08-09 DIAGNOSIS — E87.6 HYPOKALEMIA: ICD-10-CM

## 2019-08-09 LAB — HCV AB SER QL: NEGATIVE

## 2019-08-09 RX ORDER — POTASSIUM CHLORIDE 750 MG/1
10 TABLET, FILM COATED, EXTENDED RELEASE ORAL 2 TIMES DAILY
Qty: 60 TAB | Refills: 0 | Status: SHIPPED | OUTPATIENT
Start: 2019-08-09

## 2019-08-09 NOTE — PROGRESS NOTES
Please advise the patient that I have reviewed their lab results.  It shows a new decrease in his kidney function and a low potassium level.  He is not on any water pills that would explain this.  It can be explained by diarrhea or vomiting, but that did not come up during his visit yesterday.  I would like him to repeat the labs next week (these are ordered) and start taking a potassium supplement for now which I will prescribe.  We'll follow up if this is still a concern on his next labs.

## 2019-08-10 NOTE — PROGRESS NOTES
"Call placed to patient, received the following recording  \"patient has robo killer number your calling from has been blocked you may leave a message but patient may or may not answer.  I did leave a message to call the office to discuss his labs.  "

## 2019-08-24 NOTE — PROGRESS NOTES
Reached patient via phone call today.  Appointment was made per his request to discuss labs.  I did relay that you wanted him to do more labs to recheck his kidney's.  He did   the script for potassium and is taking it.  He will have labs done prior to coming in for his visit.

## 2019-10-22 ENCOUNTER — TELEPHONE (OUTPATIENT)
Dept: MEDICAL GROUP | Facility: PHYSICIAN GROUP | Age: 69
End: 2019-10-22

## 2019-10-22 NOTE — TELEPHONE ENCOUNTER
[facility] called and stated they found patient  in his house 10/18/2019. They wanted to know if he had any emergency contacts in his chart    Unsure if this was from natural or self inflected. Fuad stated they are unsure how long patient  in his home.    If you have any questions     Best number to contact Fuad: [phone].    Fuad would also like to know if you can call her regarding his past medical HX.

## 2019-10-23 NOTE — TELEPHONE ENCOUNTER
I spoke with the  regarding this patient.  He was found down at home.  She suspects that he was down for a bit before passing from natural causes in his locked house.  There is no concern for opiate abuse at this point.  He had been passed for awhile when found.  I relayed his medical conditions here.